# Patient Record
Sex: FEMALE | Race: ASIAN | NOT HISPANIC OR LATINO | Employment: FULL TIME | URBAN - METROPOLITAN AREA
[De-identification: names, ages, dates, MRNs, and addresses within clinical notes are randomized per-mention and may not be internally consistent; named-entity substitution may affect disease eponyms.]

---

## 2024-08-27 ENCOUNTER — PATIENT MESSAGE (OUTPATIENT)
Dept: ENDOCRINOLOGY | Facility: CLINIC | Age: 24
End: 2024-08-27
Payer: MEDICAID

## 2024-09-03 ENCOUNTER — LAB VISIT (OUTPATIENT)
Dept: LAB | Facility: HOSPITAL | Age: 24
End: 2024-09-03
Attending: NURSE PRACTITIONER
Payer: MEDICAID

## 2024-09-03 DIAGNOSIS — E06.0 ACUTE THYROIDITIS: ICD-10-CM

## 2024-09-03 LAB
T3FREE SERPL-MCNC: 3.75 PG/ML (ref 1.58–3.91)
T4 FREE SERPL-MCNC: 1.28 NG/DL (ref 0.7–1.48)
TSH SERPL-ACNC: <0.008 UIU/ML (ref 0.35–4.94)

## 2024-09-03 PROCEDURE — 84443 ASSAY THYROID STIM HORMONE: CPT

## 2024-09-03 PROCEDURE — 84481 FREE ASSAY (FT-3): CPT

## 2024-09-03 PROCEDURE — 84439 ASSAY OF FREE THYROXINE: CPT

## 2024-09-03 PROCEDURE — 36415 COLL VENOUS BLD VENIPUNCTURE: CPT

## 2024-09-05 ENCOUNTER — TELEPHONE (OUTPATIENT)
Dept: ENDOCRINOLOGY | Facility: CLINIC | Age: 24
End: 2024-09-05
Payer: MEDICAID

## 2024-09-05 NOTE — TELEPHONE ENCOUNTER
----- Message from Elda Tamez sent at 9/5/2024 11:37 AM CDT -----  Pt of Lizabeth        Pt called stating she's had a missed call.      Pt requesting a call back.    Pt call back number  [phone]      Please advise

## 2024-11-15 DIAGNOSIS — E06.0 ACUTE THYROIDITIS: ICD-10-CM

## 2024-11-15 RX ORDER — METHIMAZOLE 5 MG/1
TABLET ORAL
Qty: 45 TABLET | Refills: 0 | Status: SHIPPED | OUTPATIENT
Start: 2024-11-15

## 2024-11-21 ENCOUNTER — PATIENT MESSAGE (OUTPATIENT)
Dept: ENDOCRINOLOGY | Facility: CLINIC | Age: 24
End: 2024-11-21

## 2024-11-21 ENCOUNTER — OFFICE VISIT (OUTPATIENT)
Dept: ENDOCRINOLOGY | Facility: CLINIC | Age: 24
End: 2024-11-21
Payer: MEDICAID

## 2024-11-21 DIAGNOSIS — E06.3 AUTOIMMUNE THYROIDITIS: Primary | ICD-10-CM

## 2024-11-21 PROCEDURE — 1159F MED LIST DOCD IN RCRD: CPT | Mod: CPTII,95,, | Performed by: NURSE PRACTITIONER

## 2024-11-21 PROCEDURE — 99213 OFFICE O/P EST LOW 20 MIN: CPT | Mod: 95,,, | Performed by: NURSE PRACTITIONER

## 2024-11-21 PROCEDURE — 1160F RVW MEDS BY RX/DR IN RCRD: CPT | Mod: CPTII,95,, | Performed by: NURSE PRACTITIONER

## 2024-11-21 NOTE — PROGRESS NOTES
Subjective     Patient ID: Kenya Smith is a 24 y.o. female.    Chief Complaint: Hypothyroidism    Endocrine Note 08/17/2023  Dr Rashmi Nayak   History of Present Illness  HPI f/u hx of TPO Ab=, +Fhx of thyroid disease and hx of thyroiditis in 9/ 2022 for f/u.  Appetite, weight, menses, and energy levels stable. The patient denies signs and symptoms that are  consistent with hypothyroidism or hyperthyroidism.  She had been prescribed MMI in the past, but did not take it. She denies use of nutritional supplements or other meds.   She reports intermittent rash development on chest/ trunk. She has made changes in body wash due to skin reaction on back.  No changes in detergent or other environmental changes.  She denies pruritis.       Per chart review from [facility] appointment:   IV for hx of abnormal TFTs, undetectable TSH between 9 - 12/ 2022, who was placed on BBlocker and MMI in ED in [place] in 11/ 2022. She reports not taking these meds as prescribed, as these made her feel nauseated and depressed.    She reports heat intolerance, tremors and intermittent palpitations at rest, all of which have improved somewhat in the last 3 months.  Increased appetite is persistent.  The patient denies other signs and symptoms that are consistent with hypothyroidism or hyperthyroidism.    Menarche at age 14.   She reports stable menses.  She is not currently on any method of contraception, as she is not sexually active on a regular basis.       Endocrine clinic note 10/17/2023:  23-year-old female scheduled today as referral to endocrine clinic history of autoimmune thyroiditis.  Patient had established care with endocrine clinic in [place] w/ Dr Caraballo.  Patient was hyperthyroid November 2022 w/  TPO 1,173 on 11/14/2022  TSH  <0.008, Free T4 1.72 on 11/14/2022.  At that time patient was prescribed methimazole but did not take.  Repeat labs TSH 0.298, free T4 1.16 on 04/03/2023.  Most recent labs TSH 0.607.  Free T4 1.11 on 08/08/2023.  Patient reports tenderness mild enlargement of the left side of her thyroid she states the tenderness was worse a couple of weeks ago.  Patient reports fatigue and states she was sleeping a lot more.  She states when she was hyperthyroid and November 2022 she would symptoms of tremors, heat intolerance, mild weight loss.  She states currently she only remains with symptoms of heat intolerance at night.  Mother and sister have thyroid disease.  She states her sister had Graves disease at a very young age and was diagnosed in elementary school and had radioactive iodine is currently 19 yrs old and on levothyroxine.  She states her mother also has a history of hyperthyroidism currently not on medication is in remission.      Endocrine clinic note 08/05/2024:  23 year female scheduled today for endocrine clinic follow-up.  History of autoimmune thyroiditis and enlarged thyroid.  Autoimmune thyroiditis TSH 0.663, Free T4 1.07 Free T3 2.74 on 10/17/2023. TPO 1,173 on 11/14/2022 repeat labs , TSI 1.6, Trab 1.83 on 10/17/2023.  Patient currently reports that she is noticing occasional tremors and at times heat intolerance.  She denies any weight loss.  Thyroid is mildly enlarged but she denies any dysphagia.  Patient states symptoms have been occasional over the last couple of weeks.  Patient was states she is due for a Pap smear can not find anyone with her Medicaid.        The patient location is: Home   The chief complaint leading to consultation is: Hyperthyroidism     Visit type: audiovisual    Face to Face time with patient: 10  22 minutes of total time spent on the encounter, which includes face to face time and non-face to face time preparing to see the patient (eg, review of tests), Obtaining and/or reviewing separately obtained history, Documenting clinical information in the electronic or other health record, Independently interpreting results (not separately reported) and  communicating results to the patient/family/caregiver, or Care coordination (not separately reported).         Each patient to whom he or she provides medical services by telemedicine is:  (1) informed of the relationship between the physician and patient and the respective role of any other health care provider with respect to management of the patient; and (2) notified that he or she may decline to receive medical services by telemedicine and may withdraw from such care at any time.    Telemedicine Notes:  Endocrine clinic notes:  24 year female scheduled today for endocrine clinic follow-up.  History of autoimmune thyroiditis and enlarged thyroid.TSH <.008, Free T4 1.28, Free T3 3.75 on 09/03/2024. Pt is on MMI 7.5 mg daily. TPO 1,173 on 11/14/2022, , TSI 1.6, Trab 1.83 on 10/17/2023.  Patient denies hyperthyroidism she states her weight is stable.  She denies palpitations or tremors or anxiety.  Patient states she will be moving out of [facility] to [place] in January and we will need to reestablish with a new endocrinologist.               Review of Systems   Constitutional:  Negative for activity change, appetite change and fatigue.   HENT:  Negative for dental problem, hearing loss, tinnitus, trouble swallowing and goiter.    Eyes:  Negative for photophobia, pain and visual disturbance.   Respiratory:  Negative for cough, chest tightness and wheezing.    Cardiovascular:  Negative for chest pain, palpitations and leg swelling.   Gastrointestinal:  Negative for abdominal pain, constipation, diarrhea, nausea and reflux.   Endocrine: Negative for cold intolerance, heat intolerance, polydipsia and polyphagia.   Genitourinary:  Negative for difficulty urinating, flank pain, hematuria, hot flashes, menstrual irregularity, menstrual problem, nocturia and urgency.   Musculoskeletal:  Negative for back pain, gait problem, joint swelling, leg pain and joint deformity.   Integumentary:  Negative for color change,  pallor, rash and breast discharge.   Allergic/Immunologic: Negative for environmental allergies, food allergies and immunocompromised state.   Neurological:  Negative for tremors, seizures, headaches, memory loss and coordination difficulties.   Psychiatric/Behavioral:  Negative for agitation, behavioral problems and sleep disturbance. The patient is not nervous/anxious.           Objective     Physical Exam  Constitutional:       Appearance: Normal appearance.   HENT:      Head: Normocephalic.      Nose: Nose normal.   Eyes:      Conjunctiva/sclera: Conjunctivae normal.   Pulmonary:      Effort: Pulmonary effort is normal.   Musculoskeletal:      Cervical back: Normal range of motion.   Neurological:      Mental Status: She is alert.   Psychiatric:         Mood and Affect: Mood normal.         Behavior: Behavior normal.         Thought Content: Thought content normal.         Judgment: Judgment normal.            Assessment and Plan     1. Autoimmune thyroiditis  TSH <.008, Free T4 1.28, Free T3 3.75 on 09/03/2024   On MMI 7.5 mg daily    TPO 1,173 on 11/14/2022  , TSI 1.6, Trab 1.83 on 10/17/2023   Asymptomatic   Component Ref Range & Units 2 mo ago  (9/3/24) 3 mo ago  (8/5/24) 1 yr ago  (10/17/23) 1 yr ago  (8/8/23) 1 yr ago  (6/19/23) 1 yr ago  (4/3/23) 2 yr ago  (11/14/22)   TSH 0.350 - 4.940 uIU/mL <0.008 Low  <0.008 Low  0.663 0.607 0.512 0.298 Low  R, CM <0.0083 Low  R             Component Ref Range & Units 2 mo ago 3 mo ago 1 yr ago 2 yr ago   T3 Free 1.58 - 3.91 pg/mL 3.75 7.23 High  2.74 5.54 High  R                  Component Ref Range & Units 2 mo ago  (9/3/24) 3 mo ago  (8/5/24) 1 yr ago  (10/17/23) 1 yr ago  (8/8/23) 1 yr ago  (4/3/23) 2 yr ago  (11/14/22)   Thyroxine Free 0.70 - 1.48 ng/dL 1.28 1.83 High  1.07 1.11 1.16 R, CM 1.72 High               Follow up in about 5 weeks (around 12/26/2024) for Hyperthyroidism.

## 2024-11-25 ENCOUNTER — OFFICE VISIT (OUTPATIENT)
Dept: GYNECOLOGY | Facility: CLINIC | Age: 24
End: 2024-11-25
Payer: MEDICAID

## 2024-11-25 ENCOUNTER — LAB VISIT (OUTPATIENT)
Dept: LAB | Facility: HOSPITAL | Age: 24
End: 2024-11-25
Attending: NURSE PRACTITIONER
Payer: MEDICAID

## 2024-11-25 VITALS
RESPIRATION RATE: 20 BRPM | BODY MASS INDEX: 25.9 KG/M2 | HEART RATE: 69 BPM | SYSTOLIC BLOOD PRESSURE: 100 MMHG | TEMPERATURE: 98 F | WEIGHT: 137.19 LBS | HEIGHT: 61 IN | OXYGEN SATURATION: 100 % | DIASTOLIC BLOOD PRESSURE: 62 MMHG

## 2024-11-25 DIAGNOSIS — Z12.4 PAP SMEAR FOR CERVICAL CANCER SCREENING: Primary | ICD-10-CM

## 2024-11-25 DIAGNOSIS — E06.3 AUTOIMMUNE THYROIDITIS: ICD-10-CM

## 2024-11-25 DIAGNOSIS — Z11.3 ROUTINE SCREENING FOR STI (SEXUALLY TRANSMITTED INFECTION): ICD-10-CM

## 2024-11-25 DIAGNOSIS — Z30.011 ENCOUNTER FOR INITIAL PRESCRIPTION OF CONTRACEPTIVE PILLS: ICD-10-CM

## 2024-11-25 LAB
C TRACH DNA SPEC QL NAA+PROBE: NOT DETECTED
CLUE CELLS VAG QL WET PREP: ABNORMAL
N GONORRHOEA DNA SPEC QL NAA+PROBE: NOT DETECTED
SOURCE (OHS): NORMAL
T VAGINALIS VAG QL WET PREP: ABNORMAL
T3FREE SERPL-MCNC: 2.88 PG/ML (ref 1.58–3.91)
T4 FREE SERPL-MCNC: 1.07 NG/DL (ref 0.7–1.48)
TSH SERPL-ACNC: 0.84 UIU/ML (ref 0.35–4.94)
WBC #/AREA VAG WET PREP: ABNORMAL
YEAST SPEC QL WET PREP: ABNORMAL

## 2024-11-25 PROCEDURE — 99214 OFFICE O/P EST MOD 30 MIN: CPT | Mod: PBBFAC | Performed by: NURSE PRACTITIONER

## 2024-11-25 PROCEDURE — 87591 N.GONORRHOEAE DNA AMP PROB: CPT | Performed by: NURSE PRACTITIONER

## 2024-11-25 PROCEDURE — 3078F DIAST BP <80 MM HG: CPT | Mod: CPTII,,, | Performed by: NURSE PRACTITIONER

## 2024-11-25 PROCEDURE — 99385 PREV VISIT NEW AGE 18-39: CPT | Mod: S$PBB,,, | Performed by: NURSE PRACTITIONER

## 2024-11-25 PROCEDURE — 3008F BODY MASS INDEX DOCD: CPT | Mod: CPTII,,, | Performed by: NURSE PRACTITIONER

## 2024-11-25 PROCEDURE — 36415 COLL VENOUS BLD VENIPUNCTURE: CPT

## 2024-11-25 PROCEDURE — 87210 SMEAR WET MOUNT SALINE/INK: CPT | Performed by: NURSE PRACTITIONER

## 2024-11-25 PROCEDURE — 3074F SYST BP LT 130 MM HG: CPT | Mod: CPTII,,, | Performed by: NURSE PRACTITIONER

## 2024-11-25 PROCEDURE — 84481 FREE ASSAY (FT-3): CPT

## 2024-11-25 PROCEDURE — 84443 ASSAY THYROID STIM HORMONE: CPT

## 2024-11-25 PROCEDURE — 1159F MED LIST DOCD IN RCRD: CPT | Mod: CPTII,,, | Performed by: NURSE PRACTITIONER

## 2024-11-25 PROCEDURE — 84439 ASSAY OF FREE THYROXINE: CPT

## 2024-11-25 NOTE — PROGRESS NOTES
"  [facility] -  Gynecology / Women's Health Clinic      Subjective:       Patient ID: Kenya Smith is a 24 y.o. female.    Chief Complaint:  Gynecologic Exam    History of Present Illness  The patient G0 here for annual exam. Her LMP was 24. Period last 5 days and changes pads every 4 hours. Menarche @ 14. Pt has never had pap smear. Denies breast or urinary complaints. Denies pelvic pain, abnormal bleeding or discharge. Pt reports no STIs in the past and desires testing. HPV vaccinated. Currently not on contraception and desires. Last unprotected intercourse was 3 days ago. Denies tobacco use. Dep. screening 0. Denies fly hx of breast, ovarian, uterine or colon cancer.     GYN & OB History  Patient's last menstrual period was 2024.       Review of patient's allergies indicates:   Allergen Reactions    Zofran [ondansetron hcl] Hives     Past Medical History:   Diagnosis Date    Thyroiditis, unspecified 2022    Thyroiditis, unspecified      OB History    Para Term  AB Living   0 0 0 0 0 0   SAB IAB Ectopic Multiple Live Births   0 0 0 0 0        Review of Systems  Review of Systems    Negative except for pertinent findings for positives per HPI     Objective:    Physical Exam    /62 (BP Location: Right arm, Patient Position: Sitting)   Pulse 69   Temp 98.4 °F (36.9 °C) (Oral)   Resp 20   Ht 5' 1" (1.549 m)   Wt 62.2 kg (137 lb 3.2 oz)   LMP 2024   SpO2 100%   BMI 25.92 kg/m²   GENERAL: Well-developed female. No acute distress.    SKIN: Normal to inspection, warm and intact.  BREASTS: No rashes or erythema. No masses, lumps, discharge, tenderness.  VULVA: General appearance normal; external genitalia with no lesions or erythema.  VAGINA: Mucosa/vaginal vault pink, no abnormal discharge or lesions.  CERVIX: Pink, nulliparous appearing os, no erythema or abnormal discharge.  BIMANUAL EXAM: reveals a 8 week-sized uterus. The uterus is non tender. Pollo " "adnexa reveal no tenderness.  PSYCHIATRIC: Patient is oriented to person, place, and time. Mood and affect are normal.    Assessment:         ICD-10-CM ICD-9-CM   1. Pap smear for cervical cancer screening  Z12.4 V76.2   2. Routine screening for STI (sexually transmitted infection)  Z11.3 V74.5   3. Encounter for initial prescription of contraceptive pills  Z30.011 V25.01     Plan:   Kenya Norton" was seen today for gynecologic exam.    Diagnoses and all orders for this visit:    Pap smear for cervical cancer screening  -     Ambulatory referral/consult to Gynecology  -     Liquid-Based Pap Smear, Screening    Routine screening for STI (sexually transmitted infection)  -     Chlamydia/GC, PCR  -     Wet Prep, Genital  -     HIV 1/2 Ag/Ab (4th Gen); Future  -     Hepatitis C Antibody; Future  -     Hepatitis B Surface Antigen; Future  -     SYPHILIS ANTIBODY (WITH REFLEX RPR); Future    Encounter for initial prescription of contraceptive pills  -     Pregnancy, urine rapid; Future    Pap today  G/c per recommendations  Reviewed the risks and benefits of the following contraceptive agents:  Barrier protection (need to use with every use, protection against STDs);  Combined hormonal methods of contraception including pills, patch and ring with use daily, weekly and monthly use  Progesterone only methods including pills and depo injection (risk of irregular bleeding, possible 5# weight gain in first year with Depo)  LARC including implants (Nexplanon) 3 years of protection against conception (risk of irregular bleeding for 3-6 months) and IUDs (Chiquita, Kyleena, Mirena and Paragard with their use for 3, 5 and 10 years). Chiquita, Kyleena and Mirena IUD has progesterone and many patients have decrease in their cycles, some with amenorrhea and may also have irregular bleeding for first 3-6 months. Paragard has no hormones, cycles continue and may be heavier.    Pt admits to sexual activity since LMP. Pt will return to lab " with next cycle.   Denies any medical hx; no hx of blood clots; PE, DVT, MI, CVA, pt is a non-smoker. Discussed risk associated with OCP use such as MI, CVA and DVT/PE, pt accepts risk. Will start low dose OCPs, pt instructed to take at the same time each day and use back up such as condoms for first month of pills. Instructions on when to start and what to do if she misses a pill. Discussed usual side effects and needs for back up birth control. Reminded patient condoms should be used to prevent STDs.        Follow up in about 1 year (around 11/25/2025) for annual exam.

## 2024-11-26 ENCOUNTER — TELEPHONE (OUTPATIENT)
Dept: GYNECOLOGY | Facility: CLINIC | Age: 24
End: 2024-11-26
Payer: MEDICAID

## 2024-11-26 NOTE — TELEPHONE ENCOUNTER
Spoke to patient to give results and possible hygiene changes. Patient verbalized understanding.----- Message from JUMA Hendrix sent at 11/26/2024 10:01 AM CST -----  Swab shows rare clue cells indicating a mild case of BV. Not an STD but a bacterial infection in vaginal area usually related to a disruption in vaginal pH. Recommend hygiene changes to include use of unscented Dove soap, no scented soaps/ bath bombs, bubble baths. No douching. No feminine spray/powder and showers instead of baths. Instruct pt to wipe front to back and empty bladder before and after intercourse. No medication treatment at this time.

## 2024-11-26 NOTE — PROGRESS NOTES
Swab shows rare clue cells indicating a mild case of BV. Not an STD but a bacterial infection in vaginal area usually related to a disruption in vaginal pH. Recommend hygiene changes to include use of unscented Dove soap, no scented soaps/ bath bombs, bubble baths. No douching. No feminine spray/powder and showers instead of baths. Instruct pt to wipe front to back and empty bladder before and after intercourse. No medication treatment at this time.

## 2024-12-23 ENCOUNTER — OFFICE VISIT (OUTPATIENT)
Dept: ENDOCRINOLOGY | Facility: CLINIC | Age: 24
End: 2024-12-23
Payer: MEDICAID

## 2024-12-23 DIAGNOSIS — E06.3 AUTOIMMUNE THYROIDITIS: Primary | ICD-10-CM

## 2024-12-23 PROCEDURE — 1159F MED LIST DOCD IN RCRD: CPT | Mod: CPTII,95,, | Performed by: NURSE PRACTITIONER

## 2024-12-23 PROCEDURE — 1160F RVW MEDS BY RX/DR IN RCRD: CPT | Mod: CPTII,95,, | Performed by: NURSE PRACTITIONER

## 2024-12-23 PROCEDURE — 99213 OFFICE O/P EST LOW 20 MIN: CPT | Mod: 95,,, | Performed by: NURSE PRACTITIONER

## 2024-12-23 NOTE — PROGRESS NOTES
Subjective     Patient ID: Kenya Smith is a 24 y.o. female.    Chief Complaint: thyroid    Endocrine Note 08/17/2023  Dr Rashmi Nayak   History of Present Illness  HPI f/u hx of TPO Ab=, +Fhx of thyroid disease and hx of thyroiditis in 9/ 2022 for f/u.  Appetite, weight, menses, and energy levels stable. The patient denies signs and symptoms that are  consistent with hypothyroidism or hyperthyroidism.  She had been prescribed MMI in the past, but did not take it. She denies use of nutritional supplements or other meds.   She reports intermittent rash development on chest/ trunk. She has made changes in body wash due to skin reaction on back.  No changes in detergent or other environmental changes.  She denies pruritis.       Per chart review from IV Endocrinology appointment:   IV for hx of abnormal TFTs, undetectable TSH between 9 - 12/ 2022, who was placed on BBlocker and MMI in ED in [place] in 11/ 2022. She reports not taking these meds as prescribed, as these made her feel nauseated and depressed.    She reports heat intolerance, tremors and intermittent palpitations at rest, all of which have improved somewhat in the last 3 months.  Increased appetite is persistent.  The patient denies other signs and symptoms that are consistent with hypothyroidism or hyperthyroidism.    Menarche at age 14.   She reports stable menses.  She is not currently on any method of contraception, as she is not sexually active on a regular basis.       Endocrine clinic note 10/17/2023:  23-year-old female scheduled today as referral to endocrine clinic history of autoimmune thyroiditis.  Patient had established care with endocrine clinic in [place] w/ Dr Caraballo.  Patient was hyperthyroid November 2022 w/  TPO 1,173 on 11/14/2022  TSH  <0.008, Free T4 1.72 on 11/14/2022.  At that time patient was prescribed methimazole but did not take.  Repeat labs TSH 0.298, free T4 1.16 on 04/03/2023.  Most recent labs TSH 0.607. Free T4  1.11 on 08/08/2023.  Patient reports tenderness mild enlargement of the left side of her thyroid she states the tenderness was worse a couple of weeks ago.  Patient reports fatigue and states she was sleeping a lot more.  She states when she was hyperthyroid and November 2022 she would symptoms of tremors, heat intolerance, mild weight loss.  She states currently she only remains with symptoms of heat intolerance at night.  Mother and sister have thyroid disease.  She states her sister had Graves disease at a very young age and was diagnosed in elementary school and had radioactive iodine is currently 19 yrs old and on levothyroxine.  She states her mother also has a history of hyperthyroidism currently not on medication is in remission.      Endocrine clinic note 08/05/2024:  23 year female scheduled today for endocrine clinic follow-up.  History of autoimmune thyroiditis and enlarged thyroid.  Autoimmune thyroiditis TSH 0.663, Free T4 1.07 Free T3 2.74 on 10/17/2023. TPO 1,173 on 11/14/2022 repeat labs , TSI 1.6, Trab 1.83 on 10/17/2023.  Patient currently reports that she is noticing occasional tremors and at times heat intolerance.  She denies any weight loss.  Thyroid is mildly enlarged but she denies any dysphagia.  Patient states symptoms have been occasional over the last couple of weeks.  Patient was states she is due for a Pap smear can not find anyone with her Medicaid.        Telemedicine Notes:  Endocrine clinic notes:  24 year female scheduled today for endocrine clinic follow-up.  History of autoimmune thyroiditis and enlarged thyroid.TSH <.008, Free T4 1.28, Free T3 3.75 on 09/03/2024. Pt is on MMI 7.5 mg daily. TPO 1,173 on 11/14/2022, , TSI 1.6, Trab 1.83 on 10/17/2023.  Patient denies hyperthyroidism she states her weight is stable.  She denies palpitations or tremors or anxiety.  Patient states she will be moving out of [facility] to [place] in January and we will need to reestablish  with a new endocrinologist.        The patient location is: Home   The chief complaint leading to consultation is: Hyperthyroidism     Visit type: audiovisual    Face to Face time with patient: 8  14 minutes of total time spent on the encounter, which includes face to face time and non-face to face time preparing to see the patient (eg, review of tests), Obtaining and/or reviewing separately obtained history, Documenting clinical information in the electronic or other health record, Independently interpreting results (not separately reported) and communicating results to the patient/family/caregiver, or Care coordination (not separately reported).     Each patient to whom he or she provides medical services by telemedicine is:  (1) informed of the relationship between the physician and patient and the respective role of any other health care provider with respect to management of the patient; and (2) notified that he or she may decline to receive medical services by telemedicine and may withdraw from such care at any time.    Telemedicine Visit Notes:  Clinic note 12/23/2024:  24 year female scheduled today for endocrine clinic follow-up.  History of autoimmune thyroiditis and enlarged thyroid. TSH <.008, Free T4 1.28, Free T3 3.75 on 09/03/2024. Pt is on MMI 7.5 mg daily. TPO 1,173 on 11/14/2022, , TSI 1.6, Trab 1.83 on 10/17/2023.  Patient denies any symptoms of hyperthyroidism we will repeat labs next week.  [place] is [place] out of [place] and we will be giving refills until reestablish with endocrinology.                 Review of Systems   Constitutional:  Negative for activity change, appetite change and fatigue.   HENT:  Negative for dental problem, hearing loss, tinnitus, trouble swallowing and goiter.    Eyes:  Negative for photophobia, pain and visual disturbance.   Respiratory:  Negative for cough, chest tightness and wheezing.    Cardiovascular:  Negative for chest pain, palpitations and leg  swelling.   Gastrointestinal:  Negative for abdominal pain, constipation, diarrhea, nausea and reflux.   Endocrine: Negative for cold intolerance, heat intolerance, polydipsia and polyphagia.   Genitourinary:  Negative for difficulty urinating, flank pain, hematuria, hot flashes, menstrual irregularity, menstrual problem, nocturia and urgency.   Musculoskeletal:  Negative for back pain, gait problem, joint swelling, leg pain and joint deformity.   Integumentary:  Negative for color change, pallor, rash and breast discharge.   Allergic/Immunologic: Negative for environmental allergies, food allergies and immunocompromised state.   Neurological:  Negative for tremors, seizures, headaches, memory loss and coordination difficulties.   Psychiatric/Behavioral:  Negative for agitation, behavioral problems and sleep disturbance. The patient is not nervous/anxious.           Objective     Physical Exam  Constitutional:       Appearance: Normal appearance.   HENT:      Head: Normocephalic.      Nose: Nose normal.   Eyes:      Conjunctiva/sclera: Conjunctivae normal.   Pulmonary:      Effort: Pulmonary effort is normal.   Musculoskeletal:      Cervical back: Normal range of motion.   Neurological:      Mental Status: She is alert.   Psychiatric:         Mood and Affect: Mood normal.         Behavior: Behavior normal.         Thought Content: Thought content normal.         Judgment: Judgment normal.            Assessment and Plan     1. Autoimmune thyroiditis  TSH <.008, Free T4 1.28, Free T3 3.75 on 09/03/2024   On MMI 7.5 mg daily    TPO 1,173 on 11/14/2022  , TSI 1.6, Trab 1.83 on 10/17/2023   Asymptomatic   Repeat labs next week  -     TSH; Future; Expected date: 12/23/2024  -     T4, Free; Future; Expected date: 12/23/2024  -     T3, Free (OLG); Future; Expected date: 12/23/2024           Follow up for PRN patient is moving out of state.

## 2024-12-27 DIAGNOSIS — E06.0 ACUTE THYROIDITIS: ICD-10-CM

## 2024-12-27 RX ORDER — METHIMAZOLE 5 MG/1
TABLET ORAL
Qty: 135 TABLET | Refills: 0 | Status: SHIPPED | OUTPATIENT
Start: 2024-12-27

## 2024-12-30 ENCOUNTER — LAB VISIT (OUTPATIENT)
Dept: LAB | Facility: HOSPITAL | Age: 24
End: 2024-12-30
Attending: NURSE PRACTITIONER
Payer: MEDICAID

## 2024-12-30 ENCOUNTER — PATIENT MESSAGE (OUTPATIENT)
Dept: ENDOCRINOLOGY | Facility: CLINIC | Age: 24
End: 2024-12-30
Payer: MEDICAID

## 2024-12-30 DIAGNOSIS — Z30.011 ENCOUNTER FOR INITIAL PRESCRIPTION OF CONTRACEPTIVE PILLS: ICD-10-CM

## 2024-12-30 DIAGNOSIS — E06.3 AUTOIMMUNE THYROIDITIS: ICD-10-CM

## 2024-12-30 DIAGNOSIS — Z11.3 ROUTINE SCREENING FOR STI (SEXUALLY TRANSMITTED INFECTION): ICD-10-CM

## 2024-12-30 DIAGNOSIS — E06.3 AUTOIMMUNE THYROIDITIS: Primary | ICD-10-CM

## 2024-12-30 LAB
B-HCG UR QL: NEGATIVE
HBV SURFACE AG SERPL QL IA: NONREACTIVE
HCV AB SERPL QL IA: NONREACTIVE
HIV 1+2 AB+HIV1 P24 AG SERPL QL IA: NONREACTIVE
T PALLIDUM AB SER QL: NONREACTIVE
T3FREE SERPL-MCNC: 2.55 PG/ML (ref 1.58–3.91)
T4 FREE SERPL-MCNC: 0.94 NG/DL (ref 0.7–1.48)
TSH SERPL-ACNC: 1.03 UIU/ML (ref 0.35–4.94)

## 2024-12-30 PROCEDURE — 87340 HEPATITIS B SURFACE AG IA: CPT

## 2024-12-30 PROCEDURE — 81025 URINE PREGNANCY TEST: CPT

## 2024-12-30 PROCEDURE — 84443 ASSAY THYROID STIM HORMONE: CPT

## 2024-12-30 PROCEDURE — 36415 COLL VENOUS BLD VENIPUNCTURE: CPT

## 2024-12-30 PROCEDURE — 87389 HIV-1 AG W/HIV-1&-2 AB AG IA: CPT

## 2024-12-30 PROCEDURE — 84481 FREE ASSAY (FT-3): CPT

## 2024-12-30 PROCEDURE — 84439 ASSAY OF FREE THYROXINE: CPT

## 2024-12-30 PROCEDURE — 86780 TREPONEMA PALLIDUM: CPT

## 2024-12-30 PROCEDURE — 86803 HEPATITIS C AB TEST: CPT

## 2024-12-30 RX ORDER — METHIMAZOLE 5 MG/1
5 TABLET ORAL 3 TIMES DAILY
Qty: 90 TABLET | Refills: 1 | Status: SHIPPED | OUTPATIENT
Start: 2024-12-30 | End: 2025-12-30

## 2025-01-02 ENCOUNTER — TELEPHONE (OUTPATIENT)
Dept: GYNECOLOGY | Facility: CLINIC | Age: 25
End: 2025-01-02
Payer: MEDICAID

## 2025-01-02 DIAGNOSIS — Z30.011 ENCOUNTER FOR INITIAL PRESCRIPTION OF CONTRACEPTIVE PILLS: Primary | ICD-10-CM

## 2025-01-02 RX ORDER — NORETHINDRONE ACETATE AND ETHINYL ESTRADIOL 1MG-20(21)
1 KIT ORAL DAILY
Qty: 30 TABLET | Refills: 11 | Status: SHIPPED | OUTPATIENT
Start: 2025-01-02 | End: 2026-01-02

## 2025-01-03 NOTE — TELEPHONE ENCOUNTER
Please inform pt that UPT was neg.     Per our discussion at visit, she would like to proceed with low dose OCPs.    Denies any medical hx; no hx of blood clots; PE, DVT, MI, CVA, pt is a non-smoker. Discussed risk associated with OCP use such as MI, CVA and DVT/PE, pt accepts risk. Instruct to take at the same time each day and use back up such as condoms for first month of pills. Instructions on when to start and what to do if she misses a pill. Discussed usual side effects and needs for back up birth control. Reminded patient condoms should be used to prevent STDs.

## 2025-01-03 NOTE — TELEPHONE ENCOUNTER
Spoke to patient to inform of results and that birth control was sent to the pharmacy on file. Educated patient on usage and safe sex practices. Patient verbalized understanding.

## 2025-01-24 DIAGNOSIS — E06.3 AUTOIMMUNE THYROIDITIS: ICD-10-CM

## 2025-01-24 RX ORDER — METHIMAZOLE 5 MG/1
5 TABLET ORAL 3 TIMES DAILY
Qty: 90 TABLET | Refills: 1 | Status: SHIPPED | OUTPATIENT
Start: 2025-01-24 | End: 2025-02-03 | Stop reason: SDUPTHER

## 2025-02-03 ENCOUNTER — TELEPHONE (OUTPATIENT)
Dept: ENDOCRINOLOGY | Facility: CLINIC | Age: 25
End: 2025-02-03
Payer: MEDICAID

## 2025-02-03 DIAGNOSIS — E06.3 AUTOIMMUNE THYROIDITIS: ICD-10-CM

## 2025-02-03 RX ORDER — METHIMAZOLE 5 MG/1
5 TABLET ORAL 3 TIMES DAILY
Qty: 90 TABLET | Refills: 1 | Status: SHIPPED | OUTPATIENT
Start: 2025-02-03 | End: 2026-02-03

## 2025-05-16 DIAGNOSIS — E06.3 AUTOIMMUNE THYROIDITIS: ICD-10-CM

## 2025-05-16 RX ORDER — METHIMAZOLE 5 MG/1
5 TABLET ORAL 3 TIMES DAILY
Qty: 90 TABLET | Refills: 1 | Status: SHIPPED | OUTPATIENT
Start: 2025-05-16 | End: 2026-05-16

## 2025-05-19 DIAGNOSIS — E06.0 ACUTE THYROIDITIS: ICD-10-CM

## 2025-05-19 RX ORDER — METHIMAZOLE 5 MG/1
TABLET ORAL
Qty: 135 TABLET | Refills: 0 | Status: SHIPPED | OUTPATIENT
Start: 2025-05-19

## 2025-05-19 NOTE — TELEPHONE ENCOUNTER
----- Message from Paradise sent at 5/19/2025  8:01 AM CDT -----  Patient of Wendy called regarding refill for methimazole.Medication sent to [facility] on [address]  she moved out of state.Patient stated she usually gets it at [id number] - [address] AT [facility].Patient contact number [phone]:03Verito,